# Patient Record
Sex: MALE | URBAN - METROPOLITAN AREA
[De-identification: names, ages, dates, MRNs, and addresses within clinical notes are randomized per-mention and may not be internally consistent; named-entity substitution may affect disease eponyms.]

---

## 2022-02-21 LAB — SARS-COV-2 AG RESP QL IA.RAPID: NOT DETECTED

## 2022-02-22 ENCOUNTER — HISTORICAL (OUTPATIENT)
Dept: ANESTHESIOLOGY | Facility: HOSPITAL | Age: 32
End: 2022-02-22

## 2022-05-14 NOTE — OP NOTE
Patient:   Jimmy Cullen             MRN: [id number]            FIN: [id number]               Age:   32 years     Sex:  Male     :  1990   Associated Diagnoses:   Epidermal cyst   Author:   Malou Justin MD      Operative Note   Operative Information   Date/ Time:  2022 08:26:00.     Procedures Performed: Procedure Code   Excision Cyst (Right, Face) on 2022 at 32 Years.  Comments:  2022 8:21 MAXIMO - Latasha Boothe RN  auto-populated from documented surgical case.     Indications: 32-year-old white male with recurrent cyst overlying the inferior aspect of the right jaw requiring multiple surgeries.  Elected to undergo reexcision due to symptoms.     Preoperative Diagnosis: Epidermal cyst ([id number] L72.0).     Postoperative Diagnosis: Epidermal cyst ([id number] L72.0).     Surgeon: Malou Justin MD.     Anesthesia: General.     Speciman Removed: 3 cm cyst with epidermal pore and previous scar.     Description of Procedure/Findings/    Complications: Patient was brought to the operative theater laid in a supine position there of the neck was then sterilely prepped and draped in normal surgical fashion using chlorhexidine.  The right neck was then trimmed of all hair.  1% lidocaine with epinephrine used filtrated the subcutaneous tissues overlying this region.  15 blade was then used to incise the skin with dissection down to underlying fatty tissues.  Circumferential dissection was then performed down to the level of fatty tissues encompassing the cyst with a 15 blade.  The cavity was made hemostatic using Bovie cauterization.  The wound edges were then reapproximated in a multilayered fashion using 3-0 Vicryl and running 4-0 subcuticular Monocryl.  A sterile dressing was then placed upon the wound.  Patient was then relieved of anesthesia stable condition transfer postanesthesia care unit..     Esimated blood loss: loss  2  cc.     Complications: None.